# Patient Record
Sex: MALE | Race: WHITE | NOT HISPANIC OR LATINO | URBAN - METROPOLITAN AREA
[De-identification: names, ages, dates, MRNs, and addresses within clinical notes are randomized per-mention and may not be internally consistent; named-entity substitution may affect disease eponyms.]

---
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
## 2024-06-10 ENCOUNTER — OFFICE VISIT (OUTPATIENT)
Dept: FAMILY MEDICINE CLINIC | Facility: CLINIC | Age: 19
End: 2024-06-10
Payer: COMMERCIAL

## 2024-06-10 VITALS
OXYGEN SATURATION: 98 % | HEIGHT: 68 IN | HEART RATE: 55 BPM | SYSTOLIC BLOOD PRESSURE: 108 MMHG | BODY MASS INDEX: 25.72 KG/M2 | TEMPERATURE: 96.7 F | DIASTOLIC BLOOD PRESSURE: 72 MMHG | WEIGHT: 169.7 LBS

## 2024-06-10 DIAGNOSIS — Z00.00 ENCOUNTER FOR MEDICAL EXAMINATION TO ESTABLISH CARE: Primary | ICD-10-CM

## 2024-06-10 DIAGNOSIS — B36.0 TINEA VERSICOLOR: ICD-10-CM

## 2024-06-10 PROCEDURE — 99213 OFFICE O/P EST LOW 20 MIN: CPT | Performed by: FAMILY MEDICINE

## 2024-06-10 RX ORDER — FLUCONAZOLE 150 MG/1
300 TABLET ORAL WEEKLY
Qty: 4 TABLET | Refills: 0 | Status: SHIPPED | OUTPATIENT
Start: 2024-06-10

## 2024-06-10 RX ORDER — KETOCONAZOLE 20 MG/ML
SHAMPOO TOPICAL
Qty: 120 ML | Refills: 1 | Status: SHIPPED | OUTPATIENT
Start: 2024-06-10

## 2024-06-10 NOTE — PROGRESS NOTES
"Chief Complaint  Establish Care  Skin    Subjective          Jarett Gallegos presents to [facility]  History of Present Illness  Patient presents today to establish care with myself.  I reviewed his immunization records.  They are up-to-date.  He reports overall doing well.  He exercises both doing cardiovascular and strength training exercises regularly.  He does get an issue with a rash throughout the summer that goes away in the wintertime.  He gets these areas of lightly pigmented skin on his back as well as chest and abdomen.  He does report that when he gets a tan that this does improve.  He does admit to sometimes keeping on sweaty clothes after working out.  We discussed getting labs done today but patient would like to hold off.    Current Outpatient Medications   Medication Instructions    fluconazole (DIFLUCAN) 300 mg, Oral, Weekly    ketoconazole (NIZORAL) 2 % shampoo Apply to affected areas three times a week, keep on for 5 minutes, then wash off       The following portions of the patient's history were reviewed and updated as appropriate: allergies, current medications, past family history, past medical history, past social history, past surgical history, and problem list.    Objective   Vital Signs:   /72   Pulse 55   Temp 96.7 °F (35.9 °C)   Ht 172.7 cm (68\")   Wt 77 kg (169 lb 11.2 oz)   SpO2 98%   BMI 25.80 kg/m²     BP Readings from Last 3 Encounters:   06/10/24 108/72   08/11/23 131/75     Wt Readings from Last 3 Encounters:   06/10/24 77 kg (169 lb 11.2 oz) (70%, Z= 0.53)*   08/11/23 75.8 kg (167 lb) (71%, Z= 0.55)*   03/05/18 63.7 kg (140 lb 8 oz) (90%, Z= 1.28)*     * Growth percentiles are based on CDC (Boys, 2-20 Years) data.     Pediatric BMI = 79 %ile (Z= 0.79) based on CDC (Boys, 2-20 Years) BMI-for-age based on BMI available as of 6/10/2024.. BMI is >= 25 and <30. (Overweight) The following options were offered after discussion;: exercise " "counseling/recommendations and nutrition counseling/recommendations     Physical Exam  Vitals reviewed.   Constitutional:       Appearance: Normal appearance.   HENT:      Head: Normocephalic and atraumatic.      Right Ear: External ear normal.      Left Ear: External ear normal.   Eyes:      Conjunctiva/sclera: Conjunctivae normal.   Cardiovascular:      Rate and Rhythm: Normal rate and regular rhythm.      Heart sounds: No murmur heard.     No friction rub. No gallop.   Pulmonary:      Effort: Pulmonary effort is normal.      Breath sounds: Normal breath sounds. No wheezing or rhonchi.   Abdominal:      General: Bowel sounds are normal. There is no distension.      Palpations: Abdomen is soft.      Tenderness: There is no abdominal tenderness.   Skin:     Comments: Scattered macular hypopigmented lesions that are well-demarcated with slight scale.  This appears consistent with Tinea versicolor.   Neurological:      Mental Status: He is alert and oriented to person, place, and time.      Cranial Nerves: No cranial nerve deficit.   Psychiatric:         Mood and Affect: Mood and affect normal.         Behavior: Behavior normal.         Thought Content: Thought content normal.         Judgment: Judgment normal.            Result Review :   The following data was reviewed by: Carlos Alberto Stein DO on 06/10/2024:           No results found for: \"SARSANTIGEN\", \"COVID19\", \"RAPFLUA\", \"RAPFLUB\", \"FLUAAG\", \"FLUABDAG\", \"FLU\", \"FLUBAG\", \"RAPSCRN\", \"STREPAAG\", \"RSV\", \"POCPREGUR\", \"MONOSPOT\", \"INR\", \"LEADCAPBLD\", \"POCLEAD\", \"BILIRUBINUR\"    Procedures        Assessment and Plan    Diagnoses and all orders for this visit:    1. Encounter for medical examination to establish care (Primary)    2. Tinea versicolor    Other orders  -     ketoconazole (NIZORAL) 2 % shampoo; Apply to affected areas three times a week, keep on for 5 minutes, then wash off  Dispense: 120 mL; Refill: 1  -     fluconazole (Diflucan) 150 MG tablet; Take 2 " tablets by mouth 1 (One) Time Per Week.  Dispense: 4 tablet; Refill: 0    Patient presenting with Tinea Vesicolor.  I did discuss with him using ketoconazole shampoo and applying 3 days out of the week and keeping applied for 5 minutes and then washing thoroughly.  We also discussed Diflucan to take 300 mg once weekly for the next 2 weeks.  Should he have any further issues or concerns he is instructed to call or return.  Patient would like to hold off on getting labs done today.  He reports overall doing well otherwise.      Medications Discontinued During This Encounter   Medication Reason    minocycline (MINOCIN,DYNACIN) 100 MG capsule           Follow Up   Return in about 1 year (around 6/10/2025) for tinea versicolor.  Patient was given instructions and counseling regarding his condition or for health maintenance advice. Please see specific information pulled into the AVS if appropriate.       Carlos Alberto Stein DO  06/10/24  09:40 EDT

## 2025-08-07 ENCOUNTER — OFFICE VISIT (OUTPATIENT)
Dept: FAMILY MEDICINE CLINIC | Facility: CLINIC | Age: 20
End: 2025-08-07
Payer: COMMERCIAL

## 2025-08-07 VITALS
HEART RATE: 75 BPM | SYSTOLIC BLOOD PRESSURE: 122 MMHG | TEMPERATURE: 98.4 F | BODY MASS INDEX: 24.94 KG/M2 | OXYGEN SATURATION: 98 % | WEIGHT: 174.2 LBS | HEIGHT: 70 IN | DIASTOLIC BLOOD PRESSURE: 70 MMHG

## 2025-08-07 DIAGNOSIS — Z13.220 SCREENING FOR LIPID DISORDERS: ICD-10-CM

## 2025-08-07 DIAGNOSIS — Z51.81 MEDICATION MONITORING ENCOUNTER: ICD-10-CM

## 2025-08-07 DIAGNOSIS — Z00.00 ANNUAL PHYSICAL EXAM: Primary | ICD-10-CM

## 2025-08-07 DIAGNOSIS — Z11.59 NEED FOR HEPATITIS C SCREENING TEST: ICD-10-CM

## 2025-08-07 PROCEDURE — 99395 PREV VISIT EST AGE 18-39: CPT | Performed by: FAMILY MEDICINE
